# Patient Record
Sex: FEMALE | Race: WHITE | NOT HISPANIC OR LATINO | Employment: OTHER | URBAN - METROPOLITAN AREA
[De-identification: names, ages, dates, MRNs, and addresses within clinical notes are randomized per-mention and may not be internally consistent; named-entity substitution may affect disease eponyms.]

---

## 2019-04-10 ENCOUNTER — OFFICE VISIT (OUTPATIENT)
Dept: NEUROLOGY | Facility: CLINIC | Age: 59
End: 2019-04-10

## 2019-04-10 ENCOUNTER — APPOINTMENT (OUTPATIENT)
Dept: LAB | Facility: HOSPITAL | Age: 59
End: 2019-04-10

## 2019-04-10 VITALS
BODY MASS INDEX: 28.53 KG/M2 | WEIGHT: 161 LBS | DIASTOLIC BLOOD PRESSURE: 78 MMHG | HEIGHT: 63 IN | SYSTOLIC BLOOD PRESSURE: 118 MMHG

## 2019-04-10 DIAGNOSIS — G44.89 OTHER HEADACHE SYNDROME: ICD-10-CM

## 2019-04-10 DIAGNOSIS — R42 VERTIGO: Primary | ICD-10-CM

## 2019-04-10 DIAGNOSIS — R26.89 IMPAIRMENT OF BALANCE: ICD-10-CM

## 2019-04-10 LAB
ALBUMIN SERPL-MCNC: 4.5 G/DL (ref 3.5–5.2)
ALBUMIN/GLOB SERPL: 1.5 G/DL
ALP SERPL-CCNC: 79 U/L (ref 39–117)
ALT SERPL W P-5'-P-CCNC: 16 U/L (ref 1–33)
ANION GAP SERPL CALCULATED.3IONS-SCNC: 13.9 MMOL/L
AST SERPL-CCNC: 18 U/L (ref 1–32)
BASOPHILS # BLD AUTO: 0.05 10*3/MM3 (ref 0–0.2)
BASOPHILS NFR BLD AUTO: 0.8 % (ref 0–1.5)
BILIRUB SERPL-MCNC: 0.3 MG/DL (ref 0.2–1.2)
BUN BLD-MCNC: 14 MG/DL (ref 6–20)
BUN/CREAT SERPL: 16.7 (ref 7–25)
CALCIUM SPEC-SCNC: 9.6 MG/DL (ref 8.6–10.5)
CHLORIDE SERPL-SCNC: 99 MMOL/L (ref 98–107)
CO2 SERPL-SCNC: 27.1 MMOL/L (ref 22–29)
CREAT BLD-MCNC: 0.84 MG/DL (ref 0.57–1)
DEPRECATED RDW RBC AUTO: 41.4 FL (ref 37–54)
EOSINOPHIL # BLD AUTO: 0.39 10*3/MM3 (ref 0–0.4)
EOSINOPHIL NFR BLD AUTO: 6.5 % (ref 0.3–6.2)
ERYTHROCYTE [DISTWIDTH] IN BLOOD BY AUTOMATED COUNT: 11.9 % (ref 12.3–15.4)
FOLATE SERPL-MCNC: 17.9 NG/ML (ref 4.78–24.2)
GFR SERPL CREATININE-BSD FRML MDRD: 69 ML/MIN/1.73
GLOBULIN UR ELPH-MCNC: 3 GM/DL
GLUCOSE BLD-MCNC: 99 MG/DL (ref 65–99)
HCT VFR BLD AUTO: 43.6 % (ref 34–46.6)
HGB BLD-MCNC: 14.6 G/DL (ref 12–15.9)
IMM GRANULOCYTES # BLD AUTO: 0.01 10*3/MM3 (ref 0–0.05)
IMM GRANULOCYTES NFR BLD AUTO: 0.2 % (ref 0–0.5)
LYMPHOCYTES # BLD AUTO: 2.16 10*3/MM3 (ref 0.7–3.1)
LYMPHOCYTES NFR BLD AUTO: 36.1 % (ref 19.6–45.3)
MCH RBC QN AUTO: 32 PG (ref 26.6–33)
MCHC RBC AUTO-ENTMCNC: 33.5 G/DL (ref 31.5–35.7)
MCV RBC AUTO: 95.6 FL (ref 79–97)
MONOCYTES # BLD AUTO: 0.47 10*3/MM3 (ref 0.1–0.9)
MONOCYTES NFR BLD AUTO: 7.8 % (ref 5–12)
NEUTROPHILS # BLD AUTO: 2.91 10*3/MM3 (ref 1.4–7)
NEUTROPHILS NFR BLD AUTO: 48.6 % (ref 42.7–76)
NRBC BLD AUTO-RTO: 0 /100 WBC (ref 0–0)
PLATELET # BLD AUTO: 385 10*3/MM3 (ref 140–450)
PMV BLD AUTO: 10.5 FL (ref 6–12)
POTASSIUM BLD-SCNC: 3.2 MMOL/L (ref 3.5–5.2)
PROT SERPL-MCNC: 7.5 G/DL (ref 6–8.5)
RBC # BLD AUTO: 4.56 10*6/MM3 (ref 3.77–5.28)
SODIUM BLD-SCNC: 140 MMOL/L (ref 136–145)
TSH SERPL DL<=0.05 MIU/L-ACNC: 1.45 MIU/ML (ref 0.27–4.2)
VIT B12 BLD-MCNC: 402 PG/ML (ref 211–946)
WBC NRBC COR # BLD: 5.99 10*3/MM3 (ref 3.4–10.8)

## 2019-04-10 PROCEDURE — 82746 ASSAY OF FOLIC ACID SERUM: CPT | Performed by: PHYSICIAN ASSISTANT

## 2019-04-10 PROCEDURE — 80053 COMPREHEN METABOLIC PANEL: CPT | Performed by: PHYSICIAN ASSISTANT

## 2019-04-10 PROCEDURE — 82607 VITAMIN B-12: CPT | Performed by: PHYSICIAN ASSISTANT

## 2019-04-10 PROCEDURE — 36415 COLL VENOUS BLD VENIPUNCTURE: CPT | Performed by: PHYSICIAN ASSISTANT

## 2019-04-10 PROCEDURE — 99205 OFFICE O/P NEW HI 60 MIN: CPT | Performed by: PHYSICIAN ASSISTANT

## 2019-04-10 PROCEDURE — 84443 ASSAY THYROID STIM HORMONE: CPT | Performed by: PHYSICIAN ASSISTANT

## 2019-04-10 PROCEDURE — 85025 COMPLETE CBC W/AUTO DIFF WBC: CPT | Performed by: PHYSICIAN ASSISTANT

## 2019-04-10 RX ORDER — VENLAFAXINE HYDROCHLORIDE 37.5 MG/1
37.5 CAPSULE, EXTENDED RELEASE ORAL DAILY
Qty: 30 CAPSULE | Refills: 11 | Status: SHIPPED | OUTPATIENT
Start: 2019-04-10 | End: 2020-04-09

## 2019-04-10 RX ORDER — BACLOFEN 10 MG/1
10 TABLET ORAL 3 TIMES DAILY
COMMUNITY
End: 2022-03-23

## 2019-04-10 RX ORDER — LORATADINE 10 MG/1
TABLET ORAL
COMMUNITY
Start: 2019-02-05

## 2019-04-10 RX ORDER — SPIRONOLACTONE AND HYDROCHLOROTHIAZIDE 25; 25 MG/1; MG/1
1 TABLET ORAL DAILY
COMMUNITY
Start: 2019-03-12

## 2019-04-10 RX ORDER — TIMOLOL MALEATE 5 MG/ML
SOLUTION/ DROPS OPHTHALMIC
COMMUNITY
Start: 2019-01-29 | End: 2022-03-23

## 2019-04-11 NOTE — PROGRESS NOTES
"Subjective     Chief Complaint: headaches, dizziness      History of Present Illness   Lizbeth Schneider is a 58 y.o. female who comes to clinic today for evaluation of headaches. She has noted symptoms for several years marked by a sharp headache which varies in location. This is nearly daily. She notes associated nausea and vomiting as well as light and sound sensitivity. Additionally, she notes associated neck pain. Her symptoms are worse with increased stress.     She has also noted episodes of dizziness for several years. She describes this as a spinning sensation. This occurs once every 2-3 weeks and may last up to several days. She has fallen three times over the last two years due to the dizziness and has hit her head. She denies any clear loss of consciousness or serious injuries. The dizziness is worse when she leans back and is better after lying down.  Additionally, she may note lightheadedness upon standing.     Additionally, she has noted forgetfulness and word finding for at least several months. She denies any additional impairments. She denies any ADL impairment. She endorses anxiety and depression.    Prior evaluation has included an MRI of the brain which was reportedly unremarkable.      I have reviewed and confirmed the past family, social and medical history as accurate on 4/12/19.     Review of Systems   Constitutional: Negative.    Eyes: Negative.    Respiratory: Negative.    Cardiovascular: Negative.    Gastrointestinal: Negative.    Endocrine: Negative.    Genitourinary: Negative.    Musculoskeletal: Negative.    Skin: Negative.    Allergic/Immunologic: Negative.    Neurological: Positive for dizziness and headaches.   Hematological: Negative.    Psychiatric/Behavioral: Negative.        Objective     /78   Ht 160 cm (63\")   Wt 73 kg (161 lb)   BMI 28.52 kg/m²     General appearance today is normal.   Peripheral pulses were present and symmetric.  The ophthalmoscopic exam today is " unremarkable. The discs and posterior elements are unremarkable.      Physical Exam   Constitutional: She is oriented to person, place, and time.   Neurological: She is oriented to person, place, and time. She has normal strength. She has a normal Finger-Nose-Finger Test. Gait normal.   Reflex Scores:       Tricep reflexes are 1+ on the right side and 1+ on the left side.       Bicep reflexes are 1+ on the right side and 1+ on the left side.       Brachioradialis reflexes are 1+ on the right side and 1+ on the left side.       Patellar reflexes are 1+ on the right side and 1+ on the left side.  Psychiatric: Her speech is normal.        Neurologic Exam     Mental Status   Oriented to person, place, and time.   Registration: recalls 3 of 3 objects. Recall at 5 minutes: recalls 3 of 3 objects. Follows 3 step commands.   Attention: normal.   Speech: speech is normal   Level of consciousness: alert  Able to name object. Able to read. Able to repeat. Able to write. Normal comprehension.     Cranial Nerves   Cranial nerves II through XII intact.     Motor Exam   Muscle bulk: normal  Overall muscle tone: normal    Strength   Strength 5/5 throughout.     Sensory Exam   Light touch normal.     Gait, Coordination, and Reflexes     Gait  Gait: normal    Coordination   Finger to nose coordination: normal    Tremor   Resting tremor: absent    Reflexes   Right brachioradialis: 1+  Left brachioradialis: 1+  Right biceps: 1+  Left biceps: 1+  Right triceps: 1+  Left triceps: 1+  Right patellar: 1+  Left patellar: 1+        Results  MMSE= 29 (disoriented to county only)       Assessment/Plan   Lizbeth was seen today for memory loss.    Diagnoses and all orders for this visit:    Vertigo  -     Ambulatory Referral to Physical Therapy Vestibular    Impairment of balance  -     Vitamin B12  -     TSH  -     Folate  -     CBC & Differential  -     Comprehensive Metabolic Panel  -     CBC Auto Differential    Other headache  syndrome    Other orders  -     venlafaxine XR (EFFEXOR XR) 37.5 MG 24 hr capsule; Take 1 capsule by mouth Daily.          Discussion/Summary   Lizbeth Schneider comes to clinic today for evaluation of headaches and dizziness. It is possible that her headaches are related to migraines, though I am also concerned that her symptoms are cervicogenic in nature as well. Her dizziness is possibly related to Benign Positional Vertigo. As for her memory difficulties, her examination today is reassuringly normal. I am concerned that her history of anxiety and depression are negatively affecting her cognition. This was discussed at length. It was elected to obtain screening blood work . After discussing potential treatment options, it was elected to add Effexor XR for her headaches. I have also made a referral to PT for her balance impairment. Next, we may consider a referral to ENT. She will then follow up in 2 months, or sooner if needed.   I spent 60 minutes face to face with the patient with 40 minutes spent on discussing diagnosis, prognosis, diagnostic testing, evaluation, current status, treatment options and management as discussed above.       As part of this visit I reviewed radiology results and reviewed outside records.      Shante Clark PA-C

## 2019-04-12 PROBLEM — R42 VERTIGO: Status: ACTIVE | Noted: 2019-04-12

## 2019-04-12 PROBLEM — R26.89 IMPAIRMENT OF BALANCE: Status: ACTIVE | Noted: 2019-04-12

## 2021-02-01 ENCOUNTER — TELEPHONE (OUTPATIENT)
Dept: SURGERY | Facility: CLINIC | Age: 61
End: 2021-02-01

## 2021-02-01 ENCOUNTER — OFFICE VISIT (OUTPATIENT)
Dept: SURGERY | Facility: CLINIC | Age: 61
End: 2021-02-01

## 2021-02-01 VITALS
HEIGHT: 65 IN | BODY MASS INDEX: 28.32 KG/M2 | HEART RATE: 73 BPM | DIASTOLIC BLOOD PRESSURE: 79 MMHG | SYSTOLIC BLOOD PRESSURE: 120 MMHG | OXYGEN SATURATION: 99 % | WEIGHT: 170 LBS | TEMPERATURE: 98.7 F

## 2021-02-01 DIAGNOSIS — K82.8 BILIARY DYSKINESIA: Primary | ICD-10-CM

## 2021-02-01 DIAGNOSIS — Z01.818 PREOP TESTING: Primary | ICD-10-CM

## 2021-02-01 PROBLEM — R06.02 BREATH SHORTNESS: Status: ACTIVE | Noted: 2021-02-01

## 2021-02-01 PROBLEM — R55 EPISODE OF SYNCOPE: Status: ACTIVE | Noted: 2021-02-01

## 2021-02-01 PROBLEM — R00.2 AWARENESS OF HEARTBEATS: Status: ACTIVE | Noted: 2021-02-01

## 2021-02-01 PROBLEM — E78.5 DYSLIPIDEMIA: Status: ACTIVE | Noted: 2021-02-01

## 2021-02-01 PROBLEM — R60.9 ACCUMULATION OF FLUID IN TISSUES: Status: ACTIVE | Noted: 2021-02-01

## 2021-02-01 PROBLEM — R53.83 FATIGUE: Status: ACTIVE | Noted: 2021-02-01

## 2021-02-01 PROBLEM — R03.1 ARTERIAL BLOOD PRESSURE DECREASED: Status: ACTIVE | Noted: 2021-02-01

## 2021-02-01 PROCEDURE — 99204 OFFICE O/P NEW MOD 45 MIN: CPT | Performed by: SURGERY

## 2021-02-01 RX ORDER — ONDANSETRON 4 MG/1
4 TABLET, FILM COATED ORAL EVERY 8 HOURS PRN
Qty: 20 TABLET | Refills: 1 | Status: SHIPPED | OUTPATIENT
Start: 2021-02-01 | End: 2021-02-08 | Stop reason: SDUPTHER

## 2021-02-01 RX ORDER — ATORVASTATIN CALCIUM 10 MG/1
TABLET, FILM COATED ORAL
COMMUNITY
Start: 2021-01-13 | End: 2022-03-23

## 2021-02-01 NOTE — PROGRESS NOTES
Patient: Lizbeth Schneider    YOB: 1960    Date: 02/01/2021    Primary Care Provider: Norma Herrera MD    Chief Complaint   Patient presents with   • Abdominal Pain       SUBJECTIVE:    History of present illness: Patient with a 2-month history of multiple abdominal complaints.  She states that over this time.  She has had worsening and almost constant nausea.  She also has abdominal pain in the upper abdomen and specifically points to the right upper quadrant.  She states that she has a pressure feeling there.  Bending over makes this worse.  Eating as to her complaints as well.  She is having some diarrhea also.    The following portions of the patient's history were reviewed and updated as appropriate: allergies, current medications, past family history, past medical history, past social history, past surgical history and problem list.    Review of Systems   Constitutional: Negative for chills, fever and unexpected weight change.   HENT: Negative for trouble swallowing and voice change.    Eyes: Negative for visual disturbance.   Respiratory: Negative for apnea, cough, chest tightness and wheezing.    Cardiovascular: Negative for chest pain, palpitations and leg swelling.   Gastrointestinal: Positive for abdominal pain, diarrhea and nausea. Negative for abdominal distention, anal bleeding, blood in stool, constipation, rectal pain and vomiting.   Endocrine: Negative for cold intolerance and heat intolerance.   Genitourinary: Negative for difficulty urinating, dysuria, flank pain and hematuria.   Musculoskeletal: Negative for back pain, gait problem and joint swelling.   Skin: Negative for color change, rash and wound.   Neurological: Negative for dizziness, syncope, speech difficulty, weakness, numbness and headaches.   Hematological: Negative for adenopathy. Does not bruise/bleed easily.   Psychiatric/Behavioral: Negative for confusion. The patient is not nervous/anxious.        History:  Past  "Medical History:   Diagnosis Date   • Migraine        No past surgical history on file.    Family History   Problem Relation Age of Onset   • Diabetes Mother    • Stroke Mother    • Mental illness Mother    • Cancer Father    • Dementia Father    • Heart disease Father    • Hypertension Father    • Diabetes Brother        Social History     Tobacco Use   • Smoking status: Never Smoker   • Smokeless tobacco: Never Used   Substance Use Topics   • Alcohol use: No     Frequency: Never   • Drug use: Not on file       Allergies:  No Known Allergies    Medications:    Current Outpatient Medications:   •  atorvastatin (LIPITOR) 10 MG tablet, , Disp: , Rfl:   •  baclofen (LIORESAL) 10 MG tablet, Take 10 mg by mouth 3 (Three) Times a Day., Disp: , Rfl:   •  loratadine (CLARITIN) 10 MG tablet, , Disp: , Rfl:   •  spironolactone-hydrochlorothiazide (ALDACTAZIDE) 25-25 MG tablet, , Disp: , Rfl:   •  timolol (TIMOPTIC) 0.5 % ophthalmic solution, , Disp: , Rfl:     OBJECTIVE:    Vital Signs:   Vitals:    02/01/21 1310   BP: 120/79   Pulse: 73   Temp: 98.7 °F (37.1 °C)   SpO2: 99%   Weight: 77.1 kg (170 lb)   Height: 165.1 cm (65\")       Physical Exam:   General Appearance:    Alert, cooperative, in no acute distress   Head:    Normocephalic, without obvious abnormality, atraumatic   Eyes:            Normal.  No scleral icterus.  PERRLA    Neck:   No adenopathy, supple, trachea midline, no thyromegaly, no   carotid bruit, no JVD   Lungs:     Clear to auscultation,respirations regular, even and                  unlabored    Heart:    Regular rhythm and normal rate, normal S1 and S2, no            murmur   Abdomen:     Normal bowel sounds, no masses, no organomegaly, soft        non-tender, non-distended, no guarding, small umbilical hernia   Extremities:   Moves all extremities well, no edema, no cyanosis, no             redness   Skin:   No bleeding, bruising or rash   Neurologic:   Normal without gross deficits.   Psychiatric: No " evidence of depression or anxiety        Results Review:   I reviewed the patient's new clinical results.    Review of Systems was reviewed and confirmed as accurate as documented by the MA.    ASSESSMENT/PLAN:    1. Biliary dyskinesia      I had a detailed conversation with the patient.  I explained to her that although the gallbladder certainly can be causing her many of the symptoms there is at least a 10 to 15% chance that removing the gallbladder will not help.  She clearly understands this.  I gave her the options of observation and possibly trialing medication versus further work-up including endoscopy versus laparoscopic cholecystectomy.  I offered the patient a laparoscopic cholecystectomy.  I explained this procedure to them in detail and they understand the procedure.  They also understand the risks of bleeding, infection, bile leak, ductal injury, bowel injury, the possibility of converting to an open procedure etc. They understand all of these risks and I have answered all of their questions and they wish to proceed with a laparoscopic cholecystectomy rather than proceeding with any further work-up.    Electronically signed by Herb Rodriguez MD  02/01/21

## 2021-02-08 ENCOUNTER — OUTSIDE FACILITY SERVICE (OUTPATIENT)
Dept: SURGERY | Facility: CLINIC | Age: 61
End: 2021-02-08

## 2021-02-08 DIAGNOSIS — K82.8 BILIARY DYSKINESIA: Primary | ICD-10-CM

## 2021-02-08 PROCEDURE — 47563 LAPARO CHOLECYSTECTOMY/GRAPH: CPT | Performed by: SURGERY

## 2021-02-08 RX ORDER — HYDROCODONE BITARTRATE AND ACETAMINOPHEN 5; 325 MG/1; MG/1
1-2 TABLET ORAL EVERY 4 HOURS PRN
Qty: 10 TABLET | Refills: 0 | Status: SHIPPED | OUTPATIENT
Start: 2021-02-08 | End: 2021-09-02

## 2021-02-08 RX ORDER — ONDANSETRON 4 MG/1
4 TABLET, FILM COATED ORAL EVERY 8 HOURS PRN
Qty: 8 TABLET | Refills: 1 | Status: SHIPPED | OUTPATIENT
Start: 2021-02-08 | End: 2021-09-02 | Stop reason: SDUPTHER

## 2021-02-08 RX ORDER — IBUPROFEN 600 MG/1
600 TABLET ORAL EVERY 6 HOURS PRN
Qty: 20 TABLET | Refills: 0 | Status: SHIPPED | OUTPATIENT
Start: 2021-02-08 | End: 2022-03-23

## 2021-02-17 NOTE — PROGRESS NOTES
"Patient: Lizbeth Schneider    YOB: 1960    Date: 02/22/2021    Primary Care Provider: Norma Herrera MD    Chief Complaint   Patient presents with   • Post-op     lap mata       History of present illness: Patient here about 2 weeks postoperative after laparoscopic cholecystectomy for biliary dyskinesia.  Patient states that her pain that she was experiencing prior to the procedure has resolved.  Although her nausea still present but is less severe and lasts only a short time after eating  Vital Signs:   Vitals:    02/22/21 1303   BP: 116/79   Pulse: 86   Temp: 98.7 °F (37.1 °C)   Weight: 77.1 kg (169 lb 15.6 oz)   Height: 165.1 cm (65\")       Physical Exam:   General Appearance:    Alert, cooperative, in no acute distress   Abdomen:     no masses, no organomegaly, soft non-tender, non-distended, no guarding, wounds are well healed     Assessment / Plan :    1. Postoperative visit      Overall she is doing well.  Pain has resolved.  Nausea is improved.  For now she is having minor symptoms.  If she has recurrent symptoms or worsening issues she should follow-up with me.  Otherwise activity and dietary instructions were given.  She will follow-up with me as needed.    Electronically signed by Herb Rodriguez MD  02/22/21                  "

## 2021-02-22 ENCOUNTER — OFFICE VISIT (OUTPATIENT)
Dept: SURGERY | Facility: CLINIC | Age: 61
End: 2021-02-22

## 2021-02-22 VITALS
SYSTOLIC BLOOD PRESSURE: 116 MMHG | HEART RATE: 86 BPM | TEMPERATURE: 98.7 F | BODY MASS INDEX: 28.32 KG/M2 | WEIGHT: 169.97 LBS | DIASTOLIC BLOOD PRESSURE: 79 MMHG | HEIGHT: 65 IN

## 2021-02-22 DIAGNOSIS — Z48.89 POSTOPERATIVE VISIT: Primary | ICD-10-CM

## 2021-02-22 PROCEDURE — 99024 POSTOP FOLLOW-UP VISIT: CPT | Performed by: SURGERY

## 2021-09-02 ENCOUNTER — OFFICE VISIT (OUTPATIENT)
Dept: SURGERY | Facility: CLINIC | Age: 61
End: 2021-09-02

## 2021-09-02 VITALS
BODY MASS INDEX: 28.16 KG/M2 | HEIGHT: 65 IN | TEMPERATURE: 98.3 F | DIASTOLIC BLOOD PRESSURE: 74 MMHG | OXYGEN SATURATION: 98 % | SYSTOLIC BLOOD PRESSURE: 112 MMHG | HEART RATE: 98 BPM | WEIGHT: 169 LBS

## 2021-09-02 DIAGNOSIS — R10.11 RIGHT UPPER QUADRANT ABDOMINAL PAIN: Primary | ICD-10-CM

## 2021-09-02 DIAGNOSIS — R11.0 NAUSEA: ICD-10-CM

## 2021-09-02 DIAGNOSIS — K52.9 CHRONIC DIARRHEA: ICD-10-CM

## 2021-09-02 PROCEDURE — 99214 OFFICE O/P EST MOD 30 MIN: CPT | Performed by: SURGERY

## 2021-09-02 RX ORDER — CHOLESTYRAMINE 4 G/9G
1 POWDER, FOR SUSPENSION ORAL 2 TIMES DAILY WITH MEALS
Qty: 28 EACH | Refills: 1 | Status: SHIPPED | OUTPATIENT
Start: 2021-09-02

## 2021-09-02 RX ORDER — ONDANSETRON 4 MG/1
4 TABLET, FILM COATED ORAL EVERY 8 HOURS PRN
Qty: 30 TABLET | Refills: 1 | Status: SHIPPED | OUTPATIENT
Start: 2021-09-02 | End: 2022-03-23

## 2021-09-02 RX ORDER — DICYCLOMINE HYDROCHLORIDE 10 MG/1
20 CAPSULE ORAL 3 TIMES DAILY
Qty: 21 CAPSULE | Refills: 1 | Status: SHIPPED | OUTPATIENT
Start: 2021-09-02 | End: 2022-03-23

## 2021-09-02 RX ORDER — LORAZEPAM 0.5 MG/1
0.5 TABLET ORAL EVERY 8 HOURS PRN
COMMUNITY

## 2021-09-07 ENCOUNTER — TELEPHONE (OUTPATIENT)
Dept: SURGERY | Facility: CLINIC | Age: 61
End: 2021-09-07

## 2021-09-07 NOTE — TELEPHONE ENCOUNTER
There is a message in OR book to cancel EGD.  I left a message on voicemail for the patient to call back to reschedule.

## 2021-09-27 NOTE — TELEPHONE ENCOUNTER
Dr Herrera office callled saying patient wants to schedule her EGD and wants it done in [facility]

## 2021-10-20 ENCOUNTER — TELEPHONE (OUTPATIENT)
Dept: SURGERY | Facility: CLINIC | Age: 61
End: 2021-10-20

## 2021-10-20 NOTE — TELEPHONE ENCOUNTER
Called patient regarding outstanding lab that has been ordered by Dr. Rodriguez. Pending a call back at this time.

## 2021-11-15 ENCOUNTER — OUTSIDE FACILITY SERVICE (OUTPATIENT)
Dept: SURGERY | Facility: CLINIC | Age: 61
End: 2021-11-15

## 2021-11-15 PROCEDURE — 43239 EGD BIOPSY SINGLE/MULTIPLE: CPT | Performed by: SURGERY

## 2021-11-22 ENCOUNTER — OFFICE VISIT (OUTPATIENT)
Dept: SURGERY | Facility: CLINIC | Age: 61
End: 2021-11-22

## 2021-11-22 VITALS
WEIGHT: 161.8 LBS | HEART RATE: 75 BPM | BODY MASS INDEX: 26.96 KG/M2 | TEMPERATURE: 97.8 F | OXYGEN SATURATION: 98 % | DIASTOLIC BLOOD PRESSURE: 74 MMHG | SYSTOLIC BLOOD PRESSURE: 119 MMHG | HEIGHT: 65 IN

## 2021-11-22 DIAGNOSIS — R10.11 RIGHT UPPER QUADRANT ABDOMINAL PAIN: Primary | ICD-10-CM

## 2021-11-22 DIAGNOSIS — K52.9 CHRONIC DIARRHEA: ICD-10-CM

## 2021-11-22 DIAGNOSIS — R11.0 NAUSEA: ICD-10-CM

## 2021-11-22 PROCEDURE — 99213 OFFICE O/P EST LOW 20 MIN: CPT | Performed by: SURGERY

## 2021-11-22 NOTE — PROGRESS NOTES
"Patient: Lizbeth Schneider    YOB: 1960    Date: 11/22/2021    Primary Care Provider: Norma Herrera MD    Reason for Consultation: Follow-up EGD    Chief Complaint   Patient presents with   • Follow-up     EGD       History of present illness: Patient still with some occasional right upper quadrant pain.  Also with some postprandial diarrhea although this is improving.  She thinks that one of her medications that she has since stopped was a cause.  Minimal nausea at this time.    The following portions of the patient's history were reviewed and updated as appropriate: allergies, current medications, past family history, past medical history, past social history, past surgical history and problem list.    Review of Systems   Constitutional: Negative for activity change, appetite change and chills.   HENT: Negative for congestion and hearing loss.    Respiratory: Negative for cough, choking, chest tightness and shortness of breath.    Cardiovascular: Negative for chest pain, palpitations and leg swelling.   Gastrointestinal: Positive for abdominal pain, diarrhea and nausea.   Endocrine: Negative for cold intolerance and heat intolerance.   Genitourinary: Negative for dysuria, flank pain and frequency.   Musculoskeletal: Negative for back pain and myalgias.   Skin: Negative for color change and rash.   Neurological: Negative for seizures, facial asymmetry, light-headedness, numbness and headaches.   Psychiatric/Behavioral: Negative for agitation, behavioral problems and confusion.       Vital Signs:   Vitals:    11/22/21 1309   BP: 119/74   Pulse: 75   Temp: 97.8 °F (36.6 °C)   SpO2: 98%   Weight: 73.4 kg (161 lb 12.8 oz)   Height: 165.1 cm (65\")       Allergies:  No Known Allergies    Medications:    Current Outpatient Medications:   •  atorvastatin (LIPITOR) 10 MG tablet, , Disp: , Rfl:   •  baclofen (LIORESAL) 10 MG tablet, Take 10 mg by mouth 3 (Three) Times a Day., Disp: , Rfl:   •  cholestyramine " (Questran) 4 g packet, Take 1 packet by mouth 2 (Two) Times a Day With Meals., Disp: 28 each, Rfl: 1  •  dicyclomine (Bentyl) 10 MG capsule, Take 2 capsules by mouth 3 (Three) Times a Day., Disp: 21 capsule, Rfl: 1  •  ibuprofen (ADVIL,MOTRIN) 600 MG tablet, Take 1 tablet by mouth Every 6 (Six) Hours As Needed for Mild Pain ., Disp: 20 tablet, Rfl: 0  •  loratadine (CLARITIN) 10 MG tablet, , Disp: , Rfl:   •  LORazepam (ATIVAN) 0.5 MG tablet, Take 0.5 mg by mouth Every 8 (Eight) Hours As Needed for Anxiety., Disp: , Rfl:   •  ondansetron (Zofran) 4 MG tablet, Take 1 tablet by mouth Every 8 (Eight) Hours As Needed for Nausea or Vomiting., Disp: 30 tablet, Rfl: 1  •  spironolactone-hydrochlorothiazide (ALDACTAZIDE) 25-25 MG tablet, , Disp: , Rfl:   •  timolol (TIMOPTIC) 0.5 % ophthalmic solution, , Disp: , Rfl:     Physical Exam:   General Appearance:    Alert, cooperative, in no acute distress   Abdomen:     no masses, no organomegaly, soft non-tender, non-distended, no guarding, wounds are well healed, no evidence of recurrent hernia   Chest:      Clear toausculation            Cor:  Regular rate and rhythm      Results Review:   I reviewed the patient's new clinical results.  Pathology was reviewed    Review of Systems was reviewed and confirmed as accurate as documented by the MA.    Assessment / Plan:    1. Right upper quadrant abdominal pain    2. Nausea    3. Chronic diarrhea        At this time no obvious etiology for her pain.  She has not tried the Bentyl yet and she will give this a try.  She will call me to let me know if that has made any difference.  Currently the diarrhea is improved we will hold off on any intervention at this time.  She has had a relatively recent CT that was normal.  Upper endoscopy was normal.  Relatively recent colonoscopy as well.    Electronically signed by Herb Rodriguez MD  11/22/21

## 2022-03-23 ENCOUNTER — OFFICE VISIT (OUTPATIENT)
Dept: NEUROLOGY | Facility: CLINIC | Age: 62
End: 2022-03-23

## 2022-03-23 VITALS — HEART RATE: 99 BPM | HEIGHT: 63 IN | WEIGHT: 158 LBS | OXYGEN SATURATION: 97 % | BODY MASS INDEX: 28 KG/M2

## 2022-03-23 DIAGNOSIS — R51.9 NONINTRACTABLE HEADACHE, UNSPECIFIED CHRONICITY PATTERN, UNSPECIFIED HEADACHE TYPE: ICD-10-CM

## 2022-03-23 DIAGNOSIS — R20.2 NUMBNESS AND TINGLING: Primary | ICD-10-CM

## 2022-03-23 DIAGNOSIS — R20.0 NUMBNESS AND TINGLING: Primary | ICD-10-CM

## 2022-03-23 PROCEDURE — 99215 OFFICE O/P EST HI 40 MIN: CPT | Performed by: PHYSICIAN ASSISTANT

## 2022-03-23 RX ORDER — TRETINOIN 0.5 MG/G
1 CREAM TOPICAL NIGHTLY
COMMUNITY

## 2022-03-23 RX ORDER — FAMOTIDINE 20 MG/1
1 TABLET, FILM COATED ORAL DAILY
COMMUNITY

## 2022-03-23 RX ORDER — LATANOPROST 50 UG/ML
SOLUTION/ DROPS OPHTHALMIC
COMMUNITY

## 2022-03-23 RX ORDER — LORATADINE 10 MG/1
1 TABLET ORAL DAILY
COMMUNITY
End: 2022-03-23

## 2022-03-23 RX ORDER — CLINDAMYCIN HYDROCHLORIDE 300 MG/1
300 CAPSULE ORAL 2 TIMES DAILY
COMMUNITY
Start: 2022-03-21

## 2022-03-23 RX ORDER — LORAZEPAM 0.5 MG/1
1 TABLET ORAL
COMMUNITY
End: 2022-03-23

## 2022-03-23 NOTE — PROGRESS NOTES
"Subjective       Chief Complaint: facial tingling     History of Present Illness   Lizbeth Schneider is a 61 y.o. female who comes to clinic today for evaluation of facial tingling. She initially noted symptoms in late 2021 marked by intermittent facial tingling in her nose and cheeks bilaterally. Her symptoms would wake her up while sleeping. This has improved over time. There is no associated facial drooping, slurred speech, dysphagia or changes in vision. Her symptoms are worse with increased anxiety. She also reports numbness and tingling along the back of her head which radiates down her neck to her shoulders, primarily while lying on her back.     She was previously seen in my clinic in 2019 for headaches. She has noted symptoms for several years marked by a sharp headache which varies in location. This is nearly daily. She notes associated nausea and vomiting as well as light and sound sensitivity. Additionally, she notes associated neck pain. Her symptoms are worse with increased stress. She continues to note headaches, but notes that it now feels like a band of pressure around her help and feels that they are manageable.     Additionally, she reports pain, numbness and tingling in her feet bilaterally. This has also worsened over time.          I have reviewed and confirmed the past family, social and medical history as accurate on 3/23/22.     Review of Systems   Constitutional: Negative.    HENT: Negative.    Eyes: Negative.    Respiratory: Negative.    Cardiovascular: Negative.    Gastrointestinal: Negative.    Endocrine: Negative.    Genitourinary: Negative.    Musculoskeletal: Negative.    Skin: Negative.    Allergic/Immunologic: Negative.    Hematological: Negative.        Objective     Pulse 99   Ht 160 cm (63\")   Wt 71.7 kg (158 lb)   SpO2 97%   BMI 27.99 kg/m²     General appearance today is normal.         Physical Exam  Neurological:      Coordination: Finger-Nose-Finger Test and Heel to Shin " Test normal.      Gait: Gait is intact.      Deep Tendon Reflexes: Strength normal.      Reflex Scores:       Patellar reflexes are 3+ on the right side and 3+ on the left side.  Psychiatric:         Speech: Speech normal.          Neurologic Exam     Mental Status   Speech: speech is normal   Level of consciousness: alert  Normal comprehension.     Cranial Nerves   Cranial nerves II through XII intact.     Motor Exam   Muscle bulk: normal  Overall muscle tone: normal    Strength   Strength 5/5 throughout.     Sensory Exam   Light touch normal.     Gait, Coordination, and Reflexes     Gait  Gait: normal    Coordination   Finger to nose coordination: normal  Heel to shin coordination: normal    Tremor   Resting tremor: absent    Reflexes   Right patellar: 3+  Left patellar: 3+          Assessment/Plan   Diagnoses and all orders for this visit:    1. Numbness and tingling (Primary)  -     CBC & Differential  -     Comprehensive Metabolic Panel  -     Folate  -     TSH  -     Vitamin B12  -     Protein Elec + Interp, Serum  -     Vitamin B6    2. Nonintractable headache, unspecified chronicity pattern, unspecified headache type          Discussion/Summary   Lizbeth Schneider comes to clinic today for evaluation of tension headaches as well numbness and tingling. It was elected to obtain screening blood work . We discussed potentially obtaining an MRI of the cervical spine and/or EMG, which was reasonably decline for now. I also discussed adding a low dose of Cymbalta, which she will consider in the future. She will then follow up in 6 months , or sooner if needed.   Total time of visit today: 45 minutes. As part of this visit I reviewed prior lab results, reviewed radiology results, reviewed radiology images and reviewed outside records. I also discussed diagnosis, prognosis, diagnostic testing, evaluation, current status, treatment options and management as discussed above.           Shante Clark PA-C

## 2022-09-09 ENCOUNTER — LAB (OUTPATIENT)
Dept: LAB | Facility: HOSPITAL | Age: 62
End: 2022-09-09

## 2022-09-09 ENCOUNTER — OFFICE VISIT (OUTPATIENT)
Dept: NEUROLOGY | Facility: CLINIC | Age: 62
End: 2022-09-09

## 2022-09-09 VITALS
HEART RATE: 74 BPM | RESPIRATION RATE: 16 BRPM | SYSTOLIC BLOOD PRESSURE: 110 MMHG | OXYGEN SATURATION: 96 % | DIASTOLIC BLOOD PRESSURE: 70 MMHG

## 2022-09-09 DIAGNOSIS — M54.2 NECK PAIN: ICD-10-CM

## 2022-09-09 DIAGNOSIS — R20.0 NUMBNESS AND TINGLING: Primary | ICD-10-CM

## 2022-09-09 DIAGNOSIS — R20.2 NUMBNESS AND TINGLING: Primary | ICD-10-CM

## 2022-09-09 LAB
ALBUMIN SERPL-MCNC: 4.9 G/DL (ref 3.5–5.2)
ALBUMIN/GLOB SERPL: 2 G/DL
ALP SERPL-CCNC: 78 U/L (ref 39–117)
ALT SERPL W P-5'-P-CCNC: 19 U/L (ref 1–33)
ANION GAP SERPL CALCULATED.3IONS-SCNC: 11.4 MMOL/L (ref 5–15)
AST SERPL-CCNC: 22 U/L (ref 1–32)
BASOPHILS # BLD AUTO: 0.03 10*3/MM3 (ref 0–0.2)
BASOPHILS NFR BLD AUTO: 0.5 % (ref 0–1.5)
BILIRUB SERPL-MCNC: 0.6 MG/DL (ref 0–1.2)
BUN SERPL-MCNC: 12 MG/DL (ref 8–23)
BUN/CREAT SERPL: 14.3 (ref 7–25)
CALCIUM SPEC-SCNC: 10.3 MG/DL (ref 8.6–10.5)
CHLORIDE SERPL-SCNC: 100 MMOL/L (ref 98–107)
CO2 SERPL-SCNC: 28.6 MMOL/L (ref 22–29)
CREAT SERPL-MCNC: 0.84 MG/DL (ref 0.57–1)
CRP SERPL-MCNC: <0.3 MG/DL (ref 0–0.5)
DEPRECATED RDW RBC AUTO: 41.9 FL (ref 37–54)
EGFRCR SERPLBLD CKD-EPI 2021: 78.2 ML/MIN/1.73
EOSINOPHIL # BLD AUTO: 0.11 10*3/MM3 (ref 0–0.4)
EOSINOPHIL NFR BLD AUTO: 1.7 % (ref 0.3–6.2)
ERYTHROCYTE [DISTWIDTH] IN BLOOD BY AUTOMATED COUNT: 12.1 % (ref 12.3–15.4)
ERYTHROCYTE [SEDIMENTATION RATE] IN BLOOD: 19 MM/HR (ref 0–30)
FOLATE SERPL-MCNC: >20 NG/ML (ref 4.78–24.2)
GLOBULIN UR ELPH-MCNC: 2.5 GM/DL
GLUCOSE SERPL-MCNC: 98 MG/DL (ref 65–99)
HCT VFR BLD AUTO: 44.5 % (ref 34–46.6)
HGB BLD-MCNC: 14.6 G/DL (ref 12–15.9)
IMM GRANULOCYTES # BLD AUTO: 0.01 10*3/MM3 (ref 0–0.05)
IMM GRANULOCYTES NFR BLD AUTO: 0.2 % (ref 0–0.5)
LYMPHOCYTES # BLD AUTO: 1.7 10*3/MM3 (ref 0.7–3.1)
LYMPHOCYTES NFR BLD AUTO: 26.6 % (ref 19.6–45.3)
MCH RBC QN AUTO: 31.5 PG (ref 26.6–33)
MCHC RBC AUTO-ENTMCNC: 32.8 G/DL (ref 31.5–35.7)
MCV RBC AUTO: 95.9 FL (ref 79–97)
MONOCYTES # BLD AUTO: 0.43 10*3/MM3 (ref 0.1–0.9)
MONOCYTES NFR BLD AUTO: 6.7 % (ref 5–12)
NEUTROPHILS NFR BLD AUTO: 4.12 10*3/MM3 (ref 1.7–7)
NEUTROPHILS NFR BLD AUTO: 64.3 % (ref 42.7–76)
NRBC BLD AUTO-RTO: 0 /100 WBC (ref 0–0.2)
PLATELET # BLD AUTO: 437 10*3/MM3 (ref 140–450)
PMV BLD AUTO: 10 FL (ref 6–12)
POTASSIUM SERPL-SCNC: 4.2 MMOL/L (ref 3.5–5.2)
PROT SERPL-MCNC: 7.4 G/DL (ref 6–8.5)
RBC # BLD AUTO: 4.64 10*6/MM3 (ref 3.77–5.28)
SODIUM SERPL-SCNC: 140 MMOL/L (ref 136–145)
TSH SERPL DL<=0.05 MIU/L-ACNC: 1.27 UIU/ML (ref 0.27–4.2)
VIT B12 BLD-MCNC: 621 PG/ML (ref 211–946)
WBC NRBC COR # BLD: 6.4 10*3/MM3 (ref 3.4–10.8)

## 2022-09-09 PROCEDURE — 85025 COMPLETE CBC W/AUTO DIFF WBC: CPT | Performed by: PHYSICIAN ASSISTANT

## 2022-09-09 PROCEDURE — 84207 ASSAY OF VITAMIN B-6: CPT | Performed by: PHYSICIAN ASSISTANT

## 2022-09-09 PROCEDURE — 82607 VITAMIN B-12: CPT | Performed by: PHYSICIAN ASSISTANT

## 2022-09-09 PROCEDURE — 86140 C-REACTIVE PROTEIN: CPT | Performed by: PHYSICIAN ASSISTANT

## 2022-09-09 PROCEDURE — 82746 ASSAY OF FOLIC ACID SERUM: CPT | Performed by: PHYSICIAN ASSISTANT

## 2022-09-09 PROCEDURE — 84443 ASSAY THYROID STIM HORMONE: CPT | Performed by: PHYSICIAN ASSISTANT

## 2022-09-09 PROCEDURE — 80053 COMPREHEN METABOLIC PANEL: CPT | Performed by: PHYSICIAN ASSISTANT

## 2022-09-09 PROCEDURE — 84165 PROTEIN E-PHORESIS SERUM: CPT | Performed by: PHYSICIAN ASSISTANT

## 2022-09-09 PROCEDURE — 85652 RBC SED RATE AUTOMATED: CPT | Performed by: PHYSICIAN ASSISTANT

## 2022-09-09 PROCEDURE — 99215 OFFICE O/P EST HI 40 MIN: CPT | Performed by: PHYSICIAN ASSISTANT

## 2022-09-09 PROCEDURE — 36415 COLL VENOUS BLD VENIPUNCTURE: CPT | Performed by: PHYSICIAN ASSISTANT

## 2022-09-09 RX ORDER — GABAPENTIN 300 MG/1
300 CAPSULE ORAL 2 TIMES DAILY
Qty: 60 CAPSULE | Refills: 5 | Status: SHIPPED | OUTPATIENT
Start: 2022-09-09 | End: 2023-01-17 | Stop reason: DRUGHIGH

## 2022-09-09 RX ORDER — GABAPENTIN 100 MG/1
200 CAPSULE ORAL 2 TIMES DAILY
COMMUNITY
End: 2022-09-09

## 2022-09-09 NOTE — PROGRESS NOTES
Subjective       Chief Complaint: facial tingling     History of Present Illness   Lizbeth Schneider is a 62 y.o. female who returns to clinic today for evaluation of facial tingling. She initially noted symptoms in late 2021 marked by intermittent facial tingling in her nose and cheeks bilaterally. Her symptoms would wake her up while sleeping. This has improved over time. There is no associated facial drooping, slurred speech, dysphagia or changes in vision. Her symptoms are worse with increased anxiety. She also reports numbness and tingling along the back of her head which radiates down her neck to her shoulders, primarily while lying on her back.      She was previously seen in my clinic in 2019 for headaches. She has noted symptoms for several years marked by a sharp headache which varies in location. This is nearly daily. She notes associated nausea and vomiting as well as light and sound sensitivity. Additionally, she notes associated neck pain. Her symptoms are worse with increased stress. She continues to note headaches, but notes that it now feels like a band of pressure around her help and feels that they are manageable.      Additionally, she reports pain, numbness and tingling in her feet bilaterally. This has also worsened over time.     Prior evaluation has included an MRI of the brain w wo contrast in 1/22, which was reportedly unremarkable.       Today: She reports intermittent facial flushing (L>R) since approximately 7/22. This has somewhat worsened over time and occurs nearly daily. There is an associated burning sensation as well as numbness and paresthesias. Blowing cool air with a fan over the area is beneficial. There are no associated changes in vision, weakness, jaw clarification, etc. She also reports intermittent pain radiating from her left temple to her left eyebrow and cheek. These symptoms are worse with increased stress. She notes that she has been evaluated by dermatology, who  suspected rosacea and treated her with topical ivermectin, which may be somewhat beneficial. We do not have these records. Her PCP recently prescribed GBP 200mg BID, which is also somewhat beneficial. Her headaches are somewhat improved. She continues to note neck pain along with numbness and tingling which radiates occipitally.       I have reviewed and confirmed the past family, social and medical history as accurate on 9/9/22.     Review of Systems   Constitutional: Negative.    HENT: Negative.    Eyes: Negative.    Respiratory: Negative.    Cardiovascular: Negative.    Gastrointestinal: Negative.    Endocrine: Negative.    Genitourinary: Negative.    Musculoskeletal: Negative.    Skin: Negative.    Allergic/Immunologic: Negative.    Hematological: Negative.        Objective     /70   Pulse 74   Resp 16   SpO2 96%     General appearance today is normal.       Physical Exam  Neurological:      Coordination: Finger-Nose-Finger Test and Heel to Shin Test normal.      Gait: Gait is intact.      Deep Tendon Reflexes: Strength normal.   Psychiatric:         Speech: Speech normal.          Neurologic Exam     Mental Status   Speech: speech is normal   Level of consciousness: alert  Normal comprehension.     Cranial Nerves   Cranial nerves II through XII intact.     Motor Exam   Muscle bulk: normal  Overall muscle tone: normal    Strength   Strength 5/5 throughout.     Sensory Exam   Light touch normal.     Gait, Coordination, and Reflexes     Gait  Gait: normal    Coordination   Finger to nose coordination: normal  Heel to shin coordination: normal    Tremor   Resting tremor: absent      Assessment & Plan   Diagnoses and all orders for this visit:    1. Numbness and tingling (Primary)  -     Cancel: CBC & Differential  -     Cancel: Comprehensive Metabolic Panel  -     Cancel: Folate  -     Cancel: TSH  -     Cancel: Vitamin B12  -     MRI Brain With & Without Contrast; Future  -     MRI Cervical Spine With &  Without Contrast; Future  -     Sedimentation Rate  -     C-reactive Protein  -     Cancel: Protein Elec + Interp, Serum  -     Cancel: Vitamin B6    2. Neck pain  -     Cancel: CBC & Differential  -     Cancel: Comprehensive Metabolic Panel  -     Cancel: Folate  -     Cancel: TSH  -     Cancel: Vitamin B12  -     MRI Brain With & Without Contrast; Future  -     MRI Cervical Spine With & Without Contrast; Future  -     Sedimentation Rate  -     C-reactive Protein  -     Cancel: Protein Elec + Interp, Serum  -     Cancel: Vitamin B6          Discussion/Summary   Lizbeth Schneider returns to clinic today for evaluation of headaches and numbness/paresthesias as well as facial pain. The etiology of her symptoms is unclear, though my suspicion for conditions such as trigeminal neuralgia and temporal arteritis is low given her history. This was discussed in detail. It was elected to obtain screening blood work  and an MRI of the brain as well as an MRI of the cervical spine. After discussing potential treatment options, it was elected to increase her gabapentin to 300mg BID. I have also made a referral to [facility] for further evaluation. She will then follow up in my clinic on an as needed basis.    Total time of visit today: 40 minutes. As part of this visit I reviewed radiology results and reviewed outside records. I also discussed diagnosis, prognosis, diagnostic testing, evaluation, current status, treatment options and management as discussed above.       Shante Clark PA-C

## 2022-09-12 LAB
ALBUMIN SERPL ELPH-MCNC: 4 G/DL (ref 2.9–4.4)
ALBUMIN/GLOB SERPL: 1.3 {RATIO} (ref 0.7–1.7)
ALPHA1 GLOB SERPL ELPH-MCNC: 0.2 G/DL (ref 0–0.4)
ALPHA2 GLOB SERPL ELPH-MCNC: 0.8 G/DL (ref 0.4–1)
B-GLOBULIN SERPL ELPH-MCNC: 1.2 G/DL (ref 0.7–1.3)
GAMMA GLOB SERPL ELPH-MCNC: 1 G/DL (ref 0.4–1.8)
GLOBULIN SER CALC-MCNC: 3.2 G/DL (ref 2.2–3.9)
LABORATORY COMMENT REPORT: NORMAL
M PROTEIN SERPL ELPH-MCNC: NORMAL G/DL
PROT PATTERN SERPL ELPH-IMP: NORMAL
PROT SERPL-MCNC: 7.2 G/DL (ref 6–8.5)

## 2022-09-14 LAB — VIT B6 SERPL-MCNC: 15.7 UG/L (ref 3.4–65.2)

## 2022-09-15 ENCOUNTER — TELEPHONE (OUTPATIENT)
Dept: NEUROLOGY | Facility: CLINIC | Age: 62
End: 2022-09-15

## 2022-09-15 NOTE — TELEPHONE ENCOUNTER
Provider: LARISSA MANZANARES PA-C    Caller: MATHEW    Relationship to Patient: SELF      Phone Number: [phone]    Reason for Call: PT TOLD THE SCHEDULING DEPT FOR MRI'S THAT SHE DID NOT WANT THE CONTRAST.   STATES NOW SHE IS FINE WITH IT  TO HAVE THE CONTRAST WITH THE MRI'S.    PLEASE ADVISE

## 2022-09-16 NOTE — TELEPHONE ENCOUNTER
LVM for Pt that original orders for MRIs w and wo contrast are still valid and that was what the 9/22/22 apt is for still.

## 2022-09-21 ENCOUNTER — TELEPHONE (OUTPATIENT)
Dept: NEUROLOGY | Facility: CLINIC | Age: 62
End: 2022-09-21

## 2022-09-21 NOTE — TELEPHONE ENCOUNTER
Provider: GLENNA  Caller: PATIENT  Relationship to Patient: SELF  Pharmacy: N/A  Phone Number: [phone]  Reason for Call: PATIENT TELEPHONED TO LET PROVIDER KNOW THAT SHE WILL NOT BE HAVING MRI W/CONTRAST TOMORROW.    SAID SHE IS HAVING TOO MUCH FACIAL PAIN.    THIS IS AN FYI.    THANK YOU.

## 2022-09-22 ENCOUNTER — TELEPHONE (OUTPATIENT)
Dept: NEUROLOGY | Facility: CLINIC | Age: 62
End: 2022-09-22

## 2022-09-22 NOTE — TELEPHONE ENCOUNTER
[facility] called requesting order for MRI of cervical spine. Faxed order to     FAX# [phone]

## 2022-09-27 ENCOUNTER — TELEPHONE (OUTPATIENT)
Dept: NEUROLOGY | Facility: CLINIC | Age: 62
End: 2022-09-27

## 2022-09-27 NOTE — TELEPHONE ENCOUNTER
Relayed patient's message that MRI of the brain looks good, cervical spine did show some mild narrowing. EMG recommended for further work up. Patient states she would not want EMG at this time.

## 2022-09-27 NOTE — TELEPHONE ENCOUNTER
Please let patient know that I reviewed her MRI reports. MRI of the brain looked good. MRI of the cervical spine showed some mild narrowing. While this may not prove to be of clinical significance, I think it would be fair to obtain an EMG to further work it up. If she is agreeable, I will place the order. Thanks!

## 2022-11-08 ENCOUNTER — TELEPHONE (OUTPATIENT)
Dept: NEUROLOGY | Facility: CLINIC | Age: 62
End: 2022-11-08

## 2022-11-08 NOTE — TELEPHONE ENCOUNTER
Caller: Jennie Lizbeth    Relationship: Self    Best call back number: [phone]    What was the call regarding: PT CALLED TO ASK IF THE OFFICE KNOWS OF ANY OTHER NEUROLOGY GROUPS THAT WOULD BE ABLE TO SEE HER FOR A SOONER THAN HER APPT W/   NEURO ON 2/28/22. I ADVISED PT THAT IT IS PRETTY TYPICAL FOR NEUROLOGISTS TO BE BOOKING OUT SEVERAL MONTHS IN [facility] FOR NEW PATIENT APPTS. PT VERBALIZED UNDERSTANDING BUT ASKS THAT OFFICE CONTACT HER IF ANYONE KNOWS OF A NEUROLOGIST WHO COULD SEE HER SOONER.    PLEASE REVIEW AND ADVISE.

## 2023-01-10 ENCOUNTER — TELEPHONE (OUTPATIENT)
Dept: NEUROLOGY | Facility: CLINIC | Age: 63
End: 2023-01-10
Payer: MEDICARE

## 2023-01-10 NOTE — TELEPHONE ENCOUNTER
Caller: MATHEW     Relationship: SELF    Best call back number: [phone]      What was the call regarding: PT CALLED TO CONFIRM APPT , NOTICED SHE IS FOR F.U SHOULD HAVE BEEN NEW PT. FORMER LARISSA PT.     Do you require a callback: PLEASE ADVISE.

## 2023-01-11 NOTE — TELEPHONE ENCOUNTER
Patient of Shante's was scheduled as follow up by [facility]. She is being seen for numbness and tingling. Do you want to keep the appointment as is or reschedule?

## 2023-01-11 NOTE — TELEPHONE ENCOUNTER
HEMANTHM for patient regarding her appointment on 1/17/23. Dr. Harris would like to know if she can come in at 11:30 AM that day.

## 2023-01-17 ENCOUNTER — OFFICE VISIT (OUTPATIENT)
Dept: NEUROLOGY | Facility: CLINIC | Age: 63
End: 2023-01-17
Payer: MEDICARE

## 2023-01-17 VITALS
HEIGHT: 63 IN | HEART RATE: 70 BPM | WEIGHT: 166 LBS | SYSTOLIC BLOOD PRESSURE: 110 MMHG | DIASTOLIC BLOOD PRESSURE: 74 MMHG | BODY MASS INDEX: 29.41 KG/M2 | OXYGEN SATURATION: 99 %

## 2023-01-17 DIAGNOSIS — R20.2 NUMBNESS AND TINGLING: Primary | ICD-10-CM

## 2023-01-17 DIAGNOSIS — R20.0 NUMBNESS AND TINGLING: Primary | ICD-10-CM

## 2023-01-17 DIAGNOSIS — G44.209 TENSION HEADACHE: ICD-10-CM

## 2023-01-17 PROCEDURE — 99215 OFFICE O/P EST HI 40 MIN: CPT | Performed by: PSYCHIATRY & NEUROLOGY

## 2023-01-17 RX ORDER — LOSARTAN POTASSIUM AND HYDROCHLOROTHIAZIDE 12.5; 5 MG/1; MG/1
1 TABLET ORAL DAILY
COMMUNITY
Start: 2022-11-03

## 2023-01-17 RX ORDER — LOSARTAN POTASSIUM 50 MG/1
TABLET ORAL
COMMUNITY
Start: 2022-11-05

## 2023-01-17 RX ORDER — TIMOLOL MALEATE 5 MG/ML
1 SOLUTION/ DROPS OPHTHALMIC 2 TIMES DAILY
COMMUNITY
Start: 2022-11-25

## 2023-01-17 RX ORDER — GABAPENTIN 100 MG/1
CAPSULE ORAL
COMMUNITY
Start: 2023-01-05

## 2023-01-17 RX ORDER — GABAPENTIN 600 MG/1
600 TABLET ORAL NIGHTLY
COMMUNITY

## 2023-01-17 RX ORDER — CYCLOSPORINE 0.5 MG/ML
1 EMULSION OPHTHALMIC 2 TIMES DAILY
COMMUNITY
Start: 2022-12-14

## 2023-01-17 RX ORDER — GABAPENTIN 600 MG/1
600 TABLET ORAL EVERY EVENING
COMMUNITY
Start: 2023-01-05

## 2023-01-17 RX ORDER — METHYLPREDNISOLONE 4 MG/1
TABLET ORAL
Qty: 1 EACH | Refills: 0 | Status: SHIPPED | OUTPATIENT
Start: 2023-01-17

## 2023-01-17 RX ORDER — IVERMECTIN 10 MG/G
CREAM TOPICAL DAILY
COMMUNITY

## 2023-01-17 NOTE — PROGRESS NOTES
Subjective:    CC: Lizbeth Schneider is seen today in consultation at the request of CRISS Dash for Numbness and tingling       Initial visit- 62-year-old female with a history of hypertension, rosacea presents with facial pain and headaches.  As per patient about 1 year ago she started to have bilateral facial pain where her face including the sides of her nose, cheeks and teeth hurt.  She was initially diagnosed with sinusitis by her PCP and given a course of antibiotics.  She was subsequently also diagnosed with rosacea that she has been having facial flushing.  Was started on ivermectin cream that has not helped much.  She has continued to have a dull pain in her face.  A few months ago she was also having facial tingling as well as scalp tingling for which she was started on gabapentin currently at 200/600 mg.  In addition she has also had headaches that are in the front of her head on both sides mainly over her eyes with mild photophobia but no nausea, vomiting or phonophobia.  She takes Motrin daily for these.  Occasionally she gets pain in her left temple that can last for a few hours.  In addition she occasionally has left facial pain around her left eye and left lower jaw that is also infrequent.  Shante ordered detailed blood work for her including ESR and CRP that was normal, B12 level, B6 level and TSH was also normal.  She also had a MRI of the brain that showed mild maxillary sinusitis as well as left mastoid fluid but no acute intracranial abnormalities or significant chronic changes as well as a MRI cervical spine that showed multilevel degenerative changes most prominent at C4-5 and C5-6 with bilateral neural foraminal stenosis but no significant spinal canal stenosis.  Of note-I reviewed her labs and Shante's notes as follows-    Lizbeth Schneider is a 62 y.o. female who returns to clinic today for evaluation of facial tingling. She initially noted symptoms in late 2021 marked by intermittent  facial tingling in her nose and cheeks bilaterally. Her symptoms would wake her up while sleeping. This has improved over time. There is no associated facial drooping, slurred speech, dysphagia or changes in vision. Her symptoms are worse with increased anxiety. She also reports numbness and tingling along the back of her head which radiates down her neck to her shoulders, primarily while lying on her back.   She was previously seen in my clinic in 2019 for headaches. She has noted symptoms for several years marked by a sharp headache which varies in location. This is nearly daily. She notes associated nausea and vomiting as well as light and sound sensitivity. Additionally, she notes associated neck pain. Her symptoms are worse with increased stress. She continues to note headaches, but notes that it now feels like a band of pressure around her help and feels that they are manageable.    Additionally, she reports pain, numbness and tingling in her feet bilaterally. This has also worsened over time.   Prior evaluation has included an MRI of the brain w wo contrast in 1/22, which was reportedly unremarkable.   Last visit-  She reports intermittent facial flushing (L>R) since approximately 7/22. This has somewhat worsened over time and occurs nearly daily. There is an associated burning sensation as well as numbness and paresthesias. Blowing cool air with a fan over the area is beneficial. There are no associated changes in vision, weakness, jaw clarification, etc. She also reports intermittent pain radiating from her left temple to her left eyebrow and cheek. These symptoms are worse with increased stress. She notes that she has been evaluated by dermatology, who suspected rosacea and treated her with topical ivermectin, which may be somewhat beneficial. We do not have these records. Her PCP recently prescribed GBP 200mg BID, which is also somewhat beneficial. Her headaches are somewhat improved. She continues to  note neck pain along with numbness and tingling which radiates occipitally.     The following portions of the patient's history were reviewed today and updated as of 01/17/2023  : allergies, current medications, past family history, past medical history, past social history, past surgical history and problem list  These document will be scanned to patient's chart.      Current Outpatient Medications:   •  Brimonidine Tartrate (LUMIFY) 0.025 % solution ophthalmic solution, As Needed., Disp: , Rfl:   •  cholestyramine (Questran) 4 g packet, Take 1 packet by mouth 2 (Two) Times a Day With Meals., Disp: 28 each, Rfl: 1  •  clindamycin (CLEOCIN) 300 MG capsule, Take 300 mg by mouth 2 (Two) Times a Day., Disp: , Rfl:   •  cycloSPORINE (RESTASIS) 0.05 % ophthalmic emulsion, Administer 1 drop to both eyes 2 (Two) Times a Day., Disp: , Rfl:   •  famotidine (PEPCID) 20 MG tablet, Take 1 tablet by mouth Daily., Disp: , Rfl:   •  gabapentin (NEURONTIN) 100 MG capsule, TAKE 2 CAPSULES BY MOUTH EVERY DAY AT NOON, Disp: , Rfl:   •  gabapentin (NEURONTIN) 600 MG tablet, Take 600 mg by mouth Every Evening., Disp: , Rfl:   •  gabapentin (NEURONTIN) 600 MG tablet, Take 600 mg by mouth Every Night., Disp: , Rfl:   •  Ivermectin 1 % cream, Apply  topically Daily., Disp: , Rfl:   •  latanoprost (XALATAN) 0.005 % ophthalmic solution, Apply  to eye(s) as directed by provider., Disp: , Rfl:   •  loratadine (CLARITIN) 10 MG tablet, , Disp: , Rfl:   •  LORazepam (ATIVAN) 0.5 MG tablet, Take 0.5 mg by mouth Every 8 (Eight) Hours As Needed for Anxiety., Disp: , Rfl:   •  losartan (COZAAR) 50 MG tablet, , Disp: , Rfl:   •  losartan-hydrochlorothiazide (HYZAAR) 50-12.5 MG per tablet, Take 1 tablet by mouth Daily., Disp: , Rfl:   •  mupirocin (BACTROBAN) 2 % ointment, APPLY TOPICALLY TO THE AFFECTED AREA TWICE DAILY FOR RASH, Disp: , Rfl:   •  spironolactone-hydrochlorothiazide (ALDACTAZIDE) 25-25 MG tablet, Take 1 tablet by mouth Daily., Disp: ,  "Rfl:   •  timolol (TIMOPTIC) 0.5 % ophthalmic solution, Administer 1 drop to both eyes 2 (Two) Times a Day., Disp: , Rfl:   •  tretinoin (RETIN-A) 0.05 % cream, Apply 1 application topically to the appropriate area as directed Every Night., Disp: , Rfl:   •  methylPREDNISolone (MEDROL) 4 MG dose pack, Take as directed on package instructions., Disp: 1 each, Rfl: 0   Past Medical History:   Diagnosis Date   • Migraine       History reviewed. No pertinent surgical history.   Family History   Problem Relation Age of Onset   • Diabetes Mother    • Stroke Mother    • Mental illness Mother    • Cancer Father    • Dementia Father    • Heart disease Father    • Hypertension Father    • Diabetes Brother       Social History     Socioeconomic History   • Marital status:    Tobacco Use   • Smoking status: Never   • Smokeless tobacco: Never   Vaping Use   • Vaping Use: Never used   Substance and Sexual Activity   • Alcohol use: No   • Drug use: Never   • Sexual activity: Never     Review of Systems   Neurological: Positive for numbness and headache.   All other systems reviewed and are negative.      Objective:    /74   Pulse 70   Ht 160 cm (62.99\")   Wt 75.3 kg (166 lb)   SpO2 99%   BMI 29.41 kg/m²     Neurology Exam:    General apperance: NAD.  Mild tenderness to palpation of bilateral facial sinuses (both frontal and maxillary)    Mental status: Alert, awake and oriented to time place and person.    Recent and Remote memory: Intact.    Attention span and Concentration: Normal.     Language and Speech: Intact- No dysarthria.    Fluency, Naming , Repitition and Comprehension:  Intact    Cranial Nerves:   CN II: Visual fields are full. Intact.Pupils - OSVALDO  CN III, IV and VI: Extraocular movements are intact. Normal saccades.   CN V: Facial sensation is intact.   CN VII: Muscles of facial expression reveal no asymmetry. Intact.   CN VIII: Hearing is intact. Whispered voice intact.   CN IX and X: Palate " elevates symmetrically. Intact  CN XI: Shoulder shrug is intact.   CN XII: Tongue is midline without evidence of atrophy or fasciculation.     Ophthalmoscopic exam of optic disc- deferred    Motor:  Right UE muscle strength 5/5. Normal tone.     Left UE muscle strength 5/5. Normal tone.      Right LE muscle strength5/5. Normal tone.     Left LE muscle strength 5/5. Normal tone.      Sensory: Normal light touch, vibration and pinprick sensation bilaterally.    DTRs: 2+ bilaterally in upper and lower extremities.    Babinski: Negative bilaterally.    Co-ordination: Normal finger-to-nose, heel to shin B/L.    Rhomberg: Negative.    Gait: Normal.    Cardiovascular: Regular rate and rhythm without murmur, gallop or rub.    Assessment and Plan:  1. Numbness and tingling  The dull pain in her face bilaterally could be due to rosacea versus sinusitis.  MRI brain was unremarkable.    She has also continued to have her rosacea symptoms therefore I have told her to speak to her rheumatologist about switching medications.  For her sinusitis she should speak to her PCP about trying Flonase  She is currently on gabapentin 200/600 mg that has only helped some with the facial paresthesias.  I discussed switching to a different medication such as Tegretol but she wants to wait    2. Tension headache  The headaches that she is experiencing daily could be a combination of tension type and rebound headaches from daily Motrin use.  Her left temporal headaches could be migraines.  Both ESR and CRP were unremarkable  I have told her to taper and stop Motrin.  I will prescribe her a Medrol Dosepak  If the headaches recur I will start her on a prophylactic medication such as nortriptyline  Of note-interestingly there have been reports of increased migraine headaches in women diagnosed with rosacea especially over the age of 50       Return in about 6 weeks (around 2/28/2023).     I spent over 40 minutes with the patient face to face out of  which over 50% (30 minutes) was spent in management, instructions and education.     Becky Harris MD